# Patient Record
Sex: FEMALE | Race: WHITE | NOT HISPANIC OR LATINO | Employment: FULL TIME | URBAN - METROPOLITAN AREA
[De-identification: names, ages, dates, MRNs, and addresses within clinical notes are randomized per-mention and may not be internally consistent; named-entity substitution may affect disease eponyms.]

---

## 2018-12-04 ENCOUNTER — TELEPHONE (OUTPATIENT)
Dept: OTOLARYNGOLOGY | Facility: CLINIC | Age: 73
End: 2018-12-04

## 2018-12-04 NOTE — TELEPHONE ENCOUNTER
----- Message from Tho Roberts sent at 12/3/2018  3:40 PM CST -----  Contact: Mary MURILLO/[phone]  Mary is requesting a sooner appt for the patient due to her having blood in her ears.      Thank you

## 2018-12-04 NOTE — TELEPHONE ENCOUNTER
Notified kiki with [facility] that our soonest appt with dr lechuga is dec 20, 2018. Also I advised that I dont think the patient should wait that long if she has blood in her ears.

## 2020-04-16 ENCOUNTER — HOSPITAL ENCOUNTER (EMERGENCY)
Facility: HOSPITAL | Age: 75
Discharge: HOME | End: 2020-04-16
Attending: EMERGENCY MEDICINE
Payer: MEDICARE

## 2020-04-16 VITALS
DIASTOLIC BLOOD PRESSURE: 75 MMHG | TEMPERATURE: 99 F | SYSTOLIC BLOOD PRESSURE: 135 MMHG | BODY MASS INDEX: 32.32 KG/M2 | HEIGHT: 65 IN | HEART RATE: 75 BPM | OXYGEN SATURATION: 100 % | RESPIRATION RATE: 18 BRPM | WEIGHT: 194 LBS

## 2020-04-16 DIAGNOSIS — M54.42 LEFT-SIDED LOW BACK PAIN WITH LEFT-SIDED SCIATICA, UNSPECIFIED CHRONICITY: ICD-10-CM

## 2020-04-16 DIAGNOSIS — M54.32 SCIATICA OF LEFT SIDE: ICD-10-CM

## 2020-04-16 DIAGNOSIS — M51.26 LUMBAR HERNIATED DISC: Primary | ICD-10-CM

## 2020-04-16 PROCEDURE — 99284 EMERGENCY DEPT VISIT MOD MDM: CPT | Mod: 25

## 2020-04-16 PROCEDURE — 63600175 PHARM REV CODE 636 W HCPCS: Performed by: PHYSICIAN ASSISTANT

## 2020-04-16 PROCEDURE — 25000003 PHARM REV CODE 250: Performed by: PHYSICIAN ASSISTANT

## 2020-04-16 PROCEDURE — 96372 THER/PROPH/DIAG INJ SC/IM: CPT

## 2020-04-16 RX ORDER — MORPHINE SULFATE 10 MG/ML
6 INJECTION INTRAMUSCULAR; INTRAVENOUS; SUBCUTANEOUS
Status: COMPLETED | OUTPATIENT
Start: 2020-04-16 | End: 2020-04-16

## 2020-04-16 RX ORDER — TIZANIDINE 2 MG/1
2 TABLET ORAL EVERY 8 HOURS PRN
Qty: 15 TABLET | Refills: 0 | Status: SHIPPED | OUTPATIENT
Start: 2020-04-16

## 2020-04-16 RX ORDER — DIAZEPAM 5 MG/1
5 TABLET ORAL
Status: COMPLETED | OUTPATIENT
Start: 2020-04-16 | End: 2020-04-16

## 2020-04-16 RX ORDER — GABAPENTIN 600 MG/1
600 TABLET ORAL 3 TIMES DAILY
COMMUNITY
End: 2020-04-16

## 2020-04-16 RX ORDER — HYDROCODONE BITARTRATE AND ACETAMINOPHEN 5; 325 MG/1; MG/1
1 TABLET ORAL EVERY 6 HOURS PRN
Qty: 8 TABLET | Refills: 0 | Status: SHIPPED | OUTPATIENT
Start: 2020-04-16

## 2020-04-16 RX ORDER — PREDNISONE 20 MG/1
60 TABLET ORAL DAILY
Qty: 15 TABLET | Refills: 0 | Status: SHIPPED | OUTPATIENT
Start: 2020-04-16 | End: 2020-04-21

## 2020-04-16 RX ADMIN — MORPHINE SULFATE 6 MG: 10 INJECTION INTRAVENOUS at 05:04

## 2020-04-16 RX ADMIN — DIAZEPAM 5 MG: 5 TABLET ORAL at 07:04

## 2020-04-16 NOTE — ED TRIAGE NOTES
Patient arrived via EMS. Reports worsening of back pain over the past 3 days. Hx of sciatica. 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 Seen at Dr. Aguilera's office 2 days ago, given Cortisone injection with no relief. Also given rx for Gabapentin and Hycet, both last taken this morning.

## 2020-04-16 NOTE — ED PROVIDER NOTES
Encounter Date: 4/16/2020    SCRIBE #1 NOTE: I, Nimisha Cavazos, am scribing for, and in the presence of,  GONZÁLEZ Templeton. I have scribed the following portions of the note - Other sections scribed: HPI, ROS, PE.       History     Chief Complaint   Patient presents with    Back Pain     Pt reports low back pain, pt received injuection from MD recently and denies relief from medication.      This is a 74 y.o. female with a PMHx of Sciatica, Thyroid disease, and breast CA who presents to the ED complaining of left-sided lower back pain that worsened 2 days ago. She reports that she was sewing face masks 1 week ago and believes it may have aggravated her Sciatica. She notes that 2 days ago she was sitting in her car and the pain became unbearable. She states that the pain worsens with standing up and she adds that laying flat and applying pressure provides relief. She notes having numbness in her left hallux. She reports that she went to [facility] yesterday and the x-rays showed degenerative discs. She notes that she was given a steroid injection and prescribed Norco and Neurontin, which hasn't provided any relief. She denies any recent fall or trauma. She denies difficulty urinating, urinary incontinence, bowel incontinence, or numbness to the groin. She denies a past surgical history of back surgery. She notes a past surgical history of hysterectomy and breast lumpectomy. She reports allergies to Lisinopril and Topamax. She denies fever, chills, body aches, urinary frequency, and dysuria. No other associated symptoms.    The history is provided by the patient. No  was used.     Review of patient's allergies indicates:  No Known Allergies  Past Medical History:   Diagnosis Date    Breast cancer     Hypoglycemia     Migraines     Thyroid disease      Past Surgical History:   Procedure Laterality Date    BREAST LUMPECTOMY      HYSTERECTOMY       History reviewed. No pertinent family  history.  Social History     Tobacco Use    Smoking status: Never Smoker    Smokeless tobacco: Never Used   Substance Use Topics    Alcohol use: No     Alcohol/week: 0.0 standard drinks    Drug use: Never     Review of Systems   Constitutional: Negative for chills and fever.   HENT: Negative for sore throat.    Respiratory: Negative for shortness of breath.    Cardiovascular: Negative for chest pain.   Gastrointestinal: Negative for nausea.        (-) Bowel incontinence   Genitourinary: Negative for difficulty urinating, dysuria and frequency.        (-) Urinary incontinence   Musculoskeletal: Positive for back pain. Negative for myalgias.   Skin: Negative for rash.   Neurological: Positive for numbness (left hallux). Negative for weakness.   Hematological: Does not bruise/bleed easily.       Physical Exam     Initial Vitals [04/16/20 1617]   BP Pulse Resp Temp SpO2   (!) 140/67 (!) 57 18 98.1 °F (36.7 °C) 98 %      MAP       --         Physical Exam    Nursing note and vitals reviewed.  Constitutional: She appears well-developed and well-nourished. No distress.   HENT:   Head: Normocephalic and atraumatic.   Right Ear: Tympanic membrane normal.   Left Ear: Tympanic membrane normal.   Nose: Nose normal.   Mouth/Throat: Uvula is midline, oropharynx is clear and moist and mucous membranes are normal.   Eyes: EOM are normal. Pupils are equal, round, and reactive to light.   Neck: Trachea normal, normal range of motion, full passive range of motion without pain and phonation normal. Neck supple. No stridor present. No spinous process tenderness and no muscular tenderness present. Normal range of motion present. No neck rigidity.   Cardiovascular: Normal rate, regular rhythm and normal heart sounds. Exam reveals no gallop and no friction rub.    No murmur heard.  Pulmonary/Chest: Effort normal and breath sounds normal. No respiratory distress. She has no wheezes. She has no rhonchi. She has no rales.   Abdominal:  Soft. Bowel sounds are normal. There is no tenderness. There is no rebound and no guarding.   Musculoskeletal: Normal range of motion.   Neurological: She is alert and oriented to person, place, and time. She has normal strength. No cranial nerve deficit or sensory deficit.   Skin: Skin is warm and dry. Capillary refill takes less than 2 seconds.   Psychiatric: She has a normal mood and affect.         ED Course   Procedures  Labs Reviewed - No data to display       Imaging Results          MRI Lumbar Spine Without Contrast (Final result)  Result time 04/16/20 20:38:13    Final result by Calderon Corral MD (04/16/20 20:38:13)                 Impression:      Large left paracentral disc extrusion at the L4-L5 level with inferior migration and compression of the exiting left L5 nerve root.  Follow-up with spine surgery service is suggested.    Additional multilevel degenerative changes in the lower lumbar spine with posterior annular fissures extend from L3 through L5.  No significant spinal canal narrowing.      Electronically signed by: Calderon Corral MD  Date:    04/16/2020  Time:    20:38             Narrative:    EXAMINATION:  MRI LUMBAR SPINE WITHOUT CONTRAST    CLINICAL HISTORY:  Low back pain, <6wks, no red flags, no prior management;    TECHNIQUE:  Multiplanar, multisequence MR images were acquired from the thoracolumbar junction to the sacrum without the administration of contrast.    COMPARISON:  None.    FINDINGS:  There is straightening of the normal lumbar lordosis.  The lumbar alignment is otherwise unremarkable.  There are 5 lumbar type vertebral bodies.  The vertebral body heights are maintained.  There are scattered Schmorl's nodes throughout the lumbar spine.  The bone marrow signal is within normal limits.    The conus terminates at the level of L1.  The cauda equina nerve roots appear unremarkable.    There is multilevel disc desiccation.  This is most significant at the L4-L5 and L5-S1 levels.  There  are posterior annular fissures at the L3-L4, L4-L5, and L5-S1 levels.  Evaluation of the individual disc levels reveals the following:    L1-L2, there is minimal disc desiccation.  There is facet hypertrophy and ligamentum flavum hypertrophy.  The spinal canal and neural foramina are unremarkable.    L2-L3, there is disc desiccation.  There is ligamentum flavum hypertrophy and facet hypertrophy.  The spinal canal is within normal limits.  The neural foramina is unremarkable.    L3-L4, there is diffuse disc bulge along with facet hypertrophy and ligamentum flavum hypertrophy.  The spinal canal is within normal limits.  There is mild bilateral neural foraminal narrowing.    L4-L5, there is diffuse disc bulge along with facet hypertrophy and ligamentum flavum hypertrophy.  There is left paracentral disc extrusion with inferior migration.  This compression of the exiting left L5 nerve root.  There is mild narrowing the spinal canal.  There is mild right and moderate left neural foraminal narrowing.    L5-S1, there is a disc bulge along with facet hypertrophy and ligamentum flavum hypertrophy.  The spinal canal is within normal limits.  There is mild bilateral neural foraminal narrowing.    The paraspinal soft tissues are within normal limits.  There is no evidence of lymphadenopathy in the retroperitoneum.                                 Medical Decision Making:   History:   Old Medical Records: I decided to obtain old medical records.  Clinical Tests:   Radiological Study: Ordered and Reviewed  ED Management:  Hemodynamically stable. Non-toxic and in no acute distress. Denies any trauma or concerning cauda equina symptoms. Minimal relief with IM morphine. Discussed patient with my attending physician who also evaluated patient. Will obtain MRI lumbar spine. Pt signed out to oncoming ED provider team due to end of my shift. Dispo pending MRI results and reassessment.       This is Yasmany Grubertor, NP dictating.  I assumed  care of this patient from GONZÁLEZ Templeton at the end of his shift.  Patient states that her pain is improved following treatment with Valium.  MRI shows a large left paracentral disc extrusion at the L4-L5 level with inferior migration and compression of the exiting left L5 nerve root.  I discussed this case with Dr. Jose Lee (spinal surgery).  The patient will be discharged on pain medications, muscle relaxers, and oral steroids.  I will send Dr. Lee the patient's information in the EMR for follow-up.      Findings and plan discussed with the patient who is agreeable. ED return precautions given. All questions regarding diagnosis and plan were answered to the patient's fullest possible satisfaction. Patient expressed understanding of diagnosis, discharge instructions, and return precautions.            Patient note was created using [employer] voice dictation software.  Any errors in syntax may not have been identified and edited prior to signing this note.              Scribe Attestation:   Scribe #1: I performed the above scribed service and the documentation accurately describes the services I performed. I attest to the accuracy of the note.                          Clinical Impression:       ICD-10-CM ICD-9-CM   1. Lumbar herniated disc M51.26 722.10   2. Sciatica of left side M54.32 724.3   3. Left-sided low back pain with left-sided sciatica, unspecified chronicity M54.42 724.3         Disposition:   Disposition: Discharged  Condition: Stable     ED Disposition Condition    Discharge Stable        ED Prescriptions     Medication Sig Dispense Start Date End Date Auth. Provider    HYDROcodone-acetaminophen (NORCO) 5-325 mg per tablet Take 1 tablet by mouth every 6 (six) hours as needed for Pain. 8 tablet 4/16/2020  Yasmany Roe NP    predniSONE (DELTASONE) 20 MG tablet Take 3 tablets (60 mg total) by mouth once daily. for 5 days 15 tablet 4/16/2020 4/21/2020 Yasmany Roe NP    tiZANidine  (ZANAFLEX) 2 MG tablet Take 1 tablet (2 mg total) by mouth every 8 (eight) hours as needed (Muscle Spasms). 15 tablet 4/16/2020  Yasmany Roe NP        Follow-up Information     Follow up With Specialties Details Why Contact Info    Jose Lee MD Orthopedic Surgery, Spine Surgery Call  For further evaluation [address]  [facility] [address]  [phone]      [facility] Emergency Medicine Go to  If symptoms worsen, As needed [address]  [facility] [address] [phone]                      Scribe attestation: I, Shahid Up, personally performed the services described in this documentation.  All medical record entries made by the scribe were at my direction and in my presence.  I have reviewed the chart and agree that the record reflects my personal performance and is accurate and complete.             Yasmany Roe NP  04/16/20 4703

## 2020-04-17 ENCOUNTER — PES CALL (OUTPATIENT)
Dept: ADMINISTRATIVE | Facility: CLINIC | Age: 75
End: 2020-04-17

## 2020-04-17 NOTE — DISCHARGE INSTRUCTIONS
Take medications as prescribed.  Do not drive or drink alcohol when taking these medications because they will make you drowsy.    Follow-up with Dr. Lee as discussed.  Return to the emergency department for worsening symptoms.    Thank you for coming to our Emergency Department today. It is important to remember that some problems are difficult to diagnose and may not be found during your first visit. Be sure to follow up with your primary care doctor.  If you do not have one, you may contact the one listed on your discharge paperwork or you may also call the [facility] Appointment Desk at [phone] to schedule an appointment with one.     Return to the ER with any questions/concerns, new/concerning symptoms, worsening or failure to improve. Do not drive or make any important decisions for 24 hours if you have received any pain medications, sedatives or mood altering drugs during your ER visit.

## 2021-09-12 ENCOUNTER — HOSPITAL ENCOUNTER (EMERGENCY)
Facility: HOSPITAL | Age: 76
Discharge: HOME | End: 2021-09-12
Attending: EMERGENCY MEDICINE
Payer: MEDICARE

## 2021-09-12 VITALS
TEMPERATURE: 98 F | RESPIRATION RATE: 20 BRPM | OXYGEN SATURATION: 100 % | HEART RATE: 63 BPM | DIASTOLIC BLOOD PRESSURE: 83 MMHG | HEIGHT: 65 IN | BODY MASS INDEX: 30.32 KG/M2 | WEIGHT: 182 LBS | SYSTOLIC BLOOD PRESSURE: 120 MMHG

## 2021-09-12 DIAGNOSIS — N39.0 ACUTE URINARY TRACT INFECTION: Primary | ICD-10-CM

## 2021-09-12 LAB
ALBUMIN SERPL-MCNC: 4.1 G/DL (ref 3.3–5.5)
ALP SERPL-CCNC: 74 U/L (ref 42–141)
BILIRUB SERPL-MCNC: 0.8 MG/DL (ref 0.2–1.6)
BILIRUBIN, POC UA: ABNORMAL
BLOOD, POC UA: ABNORMAL
BUN SERPL-MCNC: 11 MG/DL (ref 7–22)
CALCIUM SERPL-MCNC: 9.8 MG/DL (ref 8–10.3)
CHLORIDE SERPL-SCNC: 104 MMOL/L (ref 98–108)
CLARITY, POC UA: ABNORMAL
COLOR, POC UA: ABNORMAL
CREAT SERPL-MCNC: 1 MG/DL (ref 0.6–1.2)
GLUCOSE SERPL-MCNC: 90 MG/DL (ref 73–118)
GLUCOSE, POC UA: ABNORMAL
KETONES, POC UA: ABNORMAL
LEUKOCYTE EST, POC UA: ABNORMAL
NITRITE, POC UA: POSITIVE
PH UR STRIP: >=9 [PH]
POC ALT (SGPT): 21 U/L (ref 10–47)
POC AST (SGOT): 34 U/L (ref 11–38)
POC CARDIAC TROPONIN I: 0.01 NG/ML
POC PTINR: 1 (ref 0.9–1.2)
POC PTWBT: 12.1 SEC (ref 9.7–14.3)
POC TCO2: 28 MMOL/L (ref 18–33)
POCT GLUCOSE: 86 MG/DL (ref 70–110)
POTASSIUM BLD-SCNC: 3.8 MMOL/L (ref 3.6–5.1)
PROTEIN, POC UA: ABNORMAL
PROTEIN, POC: 7.6 G/DL (ref 6.4–8.1)
SAMPLE: NORMAL
SAMPLE: NORMAL
SODIUM BLD-SCNC: 145 MMOL/L (ref 128–145)
SPECIFIC GRAVITY, POC UA: <=1.005
UROBILINOGEN, POC UA: >=8 E.U./DL

## 2021-09-12 PROCEDURE — 99284 EMERGENCY DEPT VISIT MOD MDM: CPT | Mod: 25,ER

## 2021-09-12 PROCEDURE — 87077 CULTURE AEROBIC IDENTIFY: CPT | Performed by: EMERGENCY MEDICINE

## 2021-09-12 PROCEDURE — 96365 THER/PROPH/DIAG IV INF INIT: CPT | Mod: ER

## 2021-09-12 PROCEDURE — 87186 SC STD MICRODIL/AGAR DIL: CPT | Performed by: EMERGENCY MEDICINE

## 2021-09-12 PROCEDURE — 80053 COMPREHEN METABOLIC PANEL: CPT | Mod: ER

## 2021-09-12 PROCEDURE — 85025 COMPLETE CBC W/AUTO DIFF WBC: CPT | Mod: ER

## 2021-09-12 PROCEDURE — 25000003 PHARM REV CODE 250: Mod: ER | Performed by: EMERGENCY MEDICINE

## 2021-09-12 PROCEDURE — 81003 URINALYSIS AUTO W/O SCOPE: CPT | Mod: ER

## 2021-09-12 PROCEDURE — 87088 URINE BACTERIA CULTURE: CPT | Performed by: EMERGENCY MEDICINE

## 2021-09-12 PROCEDURE — 85610 PROTHROMBIN TIME: CPT | Mod: ER

## 2021-09-12 PROCEDURE — 63600175 PHARM REV CODE 636 W HCPCS: Mod: ER | Performed by: EMERGENCY MEDICINE

## 2021-09-12 PROCEDURE — 87086 URINE CULTURE/COLONY COUNT: CPT | Performed by: EMERGENCY MEDICINE

## 2021-09-12 PROCEDURE — 82962 GLUCOSE BLOOD TEST: CPT | Mod: ER

## 2021-09-12 RX ORDER — IBUPROFEN 600 MG/1
600 TABLET ORAL EVERY 6 HOURS PRN
Qty: 20 TABLET | Refills: 0 | Status: SHIPPED | OUTPATIENT
Start: 2021-09-12

## 2021-09-12 RX ORDER — ACETAMINOPHEN 500 MG
1000 TABLET ORAL EVERY 6 HOURS PRN
Qty: 30 TABLET | Refills: 0 | Status: SHIPPED | OUTPATIENT
Start: 2021-09-12

## 2021-09-12 RX ORDER — PHENAZOPYRIDINE HYDROCHLORIDE 100 MG/1
200 TABLET, FILM COATED ORAL
Status: COMPLETED | OUTPATIENT
Start: 2021-09-12 | End: 2021-09-12

## 2021-09-12 RX ORDER — PHENAZOPYRIDINE HYDROCHLORIDE 200 MG/1
200 TABLET, FILM COATED ORAL 3 TIMES DAILY PRN
Qty: 6 TABLET | Refills: 0 | Status: SHIPPED | OUTPATIENT
Start: 2021-09-12 | End: 2021-09-14

## 2021-09-12 RX ORDER — SULFAMETHOXAZOLE AND TRIMETHOPRIM 800; 160 MG/1; MG/1
1 TABLET ORAL 2 TIMES DAILY
Qty: 10 TABLET | Refills: 0 | Status: SHIPPED | OUTPATIENT
Start: 2021-09-12 | End: 2021-09-17

## 2021-09-12 RX ADMIN — CEFTRIAXONE 1 G: 1 INJECTION, SOLUTION INTRAVENOUS at 08:09

## 2021-09-12 RX ADMIN — PHENAZOPYRIDINE HYDROCHLORIDE 200 MG: 100 TABLET ORAL at 09:09

## 2021-09-14 ENCOUNTER — TELEPHONE (OUTPATIENT)
Dept: UROLOGY | Facility: CLINIC | Age: 76
End: 2021-09-14

## 2021-09-14 LAB — BACTERIA UR CULT: ABNORMAL

## 2021-09-16 ENCOUNTER — TELEPHONE (OUTPATIENT)
Dept: EMERGENCY MEDICINE | Facility: HOSPITAL | Age: 76
End: 2021-09-16

## 2021-09-16 RX ORDER — DOXYCYCLINE 100 MG/1
100 CAPSULE ORAL EVERY 12 HOURS
Qty: 14 CAPSULE | Refills: 0 | Status: SHIPPED | OUTPATIENT
Start: 2021-09-16 | End: 2021-09-23

## 2021-09-16 RX ORDER — FLUCONAZOLE 150 MG/1
150 TABLET ORAL ONCE
Qty: 1 TABLET | Refills: 0 | Status: SHIPPED | OUTPATIENT
Start: 2021-09-16 | End: 2021-09-16

## 2021-09-22 ENCOUNTER — OFFICE VISIT (OUTPATIENT)
Dept: UROLOGY | Facility: CLINIC | Age: 76
End: 2021-09-22
Payer: MEDICARE

## 2021-09-22 VITALS — WEIGHT: 182.13 LBS | HEIGHT: 65 IN | BODY MASS INDEX: 30.34 KG/M2

## 2021-09-22 DIAGNOSIS — R15.1 FECAL SMEARING: ICD-10-CM

## 2021-09-22 DIAGNOSIS — N30.90 CYSTITIS: Primary | ICD-10-CM

## 2021-09-22 PROCEDURE — 99999 PR PBB SHADOW E&M-EST. PATIENT-LVL IV: ICD-10-PCS | Mod: PBBFAC,,, | Performed by: STUDENT IN AN ORGANIZED HEALTH CARE EDUCATION/TRAINING PROGRAM

## 2021-09-22 PROCEDURE — 1101F PR PT FALLS ASSESS DOC 0-1 FALLS W/OUT INJ PAST YR: ICD-10-PCS | Mod: CPTII,S$GLB,, | Performed by: STUDENT IN AN ORGANIZED HEALTH CARE EDUCATION/TRAINING PROGRAM

## 2021-09-22 PROCEDURE — 1160F RVW MEDS BY RX/DR IN RCRD: CPT | Mod: CPTII,S$GLB,, | Performed by: STUDENT IN AN ORGANIZED HEALTH CARE EDUCATION/TRAINING PROGRAM

## 2021-09-22 PROCEDURE — 3288F FALL RISK ASSESSMENT DOCD: CPT | Mod: CPTII,S$GLB,, | Performed by: STUDENT IN AN ORGANIZED HEALTH CARE EDUCATION/TRAINING PROGRAM

## 2021-09-22 PROCEDURE — 1126F AMNT PAIN NOTED NONE PRSNT: CPT | Mod: CPTII,S$GLB,, | Performed by: STUDENT IN AN ORGANIZED HEALTH CARE EDUCATION/TRAINING PROGRAM

## 2021-09-22 PROCEDURE — 99999 PR PBB SHADOW E&M-EST. PATIENT-LVL IV: CPT | Mod: PBBFAC,,, | Performed by: STUDENT IN AN ORGANIZED HEALTH CARE EDUCATION/TRAINING PROGRAM

## 2021-09-22 PROCEDURE — 1101F PT FALLS ASSESS-DOCD LE1/YR: CPT | Mod: CPTII,S$GLB,, | Performed by: STUDENT IN AN ORGANIZED HEALTH CARE EDUCATION/TRAINING PROGRAM

## 2021-09-22 PROCEDURE — 1159F MED LIST DOCD IN RCRD: CPT | Mod: CPTII,S$GLB,, | Performed by: STUDENT IN AN ORGANIZED HEALTH CARE EDUCATION/TRAINING PROGRAM

## 2021-09-22 PROCEDURE — 99203 PR OFFICE/OUTPT VISIT, NEW, LEVL III, 30-44 MIN: ICD-10-PCS | Mod: S$GLB,,, | Performed by: STUDENT IN AN ORGANIZED HEALTH CARE EDUCATION/TRAINING PROGRAM

## 2021-09-22 PROCEDURE — 1159F PR MEDICATION LIST DOCUMENTED IN MEDICAL RECORD: ICD-10-PCS | Mod: CPTII,S$GLB,, | Performed by: STUDENT IN AN ORGANIZED HEALTH CARE EDUCATION/TRAINING PROGRAM

## 2021-09-22 PROCEDURE — 99203 OFFICE O/P NEW LOW 30 MIN: CPT | Mod: S$GLB,,, | Performed by: STUDENT IN AN ORGANIZED HEALTH CARE EDUCATION/TRAINING PROGRAM

## 2021-09-22 PROCEDURE — 87086 URINE CULTURE/COLONY COUNT: CPT | Performed by: STUDENT IN AN ORGANIZED HEALTH CARE EDUCATION/TRAINING PROGRAM

## 2021-09-22 PROCEDURE — 3288F PR FALLS RISK ASSESSMENT DOCUMENTED: ICD-10-PCS | Mod: CPTII,S$GLB,, | Performed by: STUDENT IN AN ORGANIZED HEALTH CARE EDUCATION/TRAINING PROGRAM

## 2021-09-22 PROCEDURE — 1160F PR REVIEW ALL MEDS BY PRESCRIBER/CLIN PHARMACIST DOCUMENTED: ICD-10-PCS | Mod: CPTII,S$GLB,, | Performed by: STUDENT IN AN ORGANIZED HEALTH CARE EDUCATION/TRAINING PROGRAM

## 2021-09-22 PROCEDURE — 1126F PR PAIN SEVERITY QUANTIFIED, NO PAIN PRESENT: ICD-10-PCS | Mod: CPTII,S$GLB,, | Performed by: STUDENT IN AN ORGANIZED HEALTH CARE EDUCATION/TRAINING PROGRAM

## 2021-09-22 RX ORDER — GABAPENTIN 300 MG/1
300 CAPSULE ORAL 3 TIMES DAILY
COMMUNITY

## 2021-09-24 ENCOUNTER — TELEPHONE (OUTPATIENT)
Dept: UROLOGY | Facility: CLINIC | Age: 76
End: 2021-09-24

## 2021-09-24 LAB — BACTERIA UR CULT: NORMAL

## 2022-07-16 ENCOUNTER — HOSPITAL ENCOUNTER (EMERGENCY)
Facility: HOSPITAL | Age: 77
Discharge: HOME | End: 2022-07-16
Attending: INTERNAL MEDICINE
Payer: MEDICARE

## 2022-07-16 VITALS
DIASTOLIC BLOOD PRESSURE: 69 MMHG | HEIGHT: 65 IN | HEART RATE: 78 BPM | BODY MASS INDEX: 29.66 KG/M2 | RESPIRATION RATE: 17 BRPM | SYSTOLIC BLOOD PRESSURE: 131 MMHG | OXYGEN SATURATION: 98 % | TEMPERATURE: 98 F | WEIGHT: 178 LBS

## 2022-07-16 DIAGNOSIS — N30.01 ACUTE CYSTITIS WITH HEMATURIA: Primary | ICD-10-CM

## 2022-07-16 LAB
BILIRUBIN, POC UA: NEGATIVE
BLOOD, POC UA: ABNORMAL
CLARITY, POC UA: CLEAR
COLOR, POC UA: YELLOW
GLUCOSE, POC UA: NEGATIVE
KETONES, POC UA: NEGATIVE
LEUKOCYTE EST, POC UA: ABNORMAL
NITRITE, POC UA: NEGATIVE
PH UR STRIP: 6 [PH]
PROTEIN, POC UA: NEGATIVE
SPECIFIC GRAVITY, POC UA: 1.01
UROBILINOGEN, POC UA: 0.2 E.U./DL

## 2022-07-16 PROCEDURE — 81003 URINALYSIS AUTO W/O SCOPE: CPT | Mod: ER

## 2022-07-16 PROCEDURE — 99284 EMERGENCY DEPT VISIT MOD MDM: CPT | Mod: ER

## 2022-07-16 PROCEDURE — 25000003 PHARM REV CODE 250: Mod: ER | Performed by: INTERNAL MEDICINE

## 2022-07-16 PROCEDURE — 87086 URINE CULTURE/COLONY COUNT: CPT | Performed by: NURSE PRACTITIONER

## 2022-07-16 RX ORDER — PHENAZOPYRIDINE HYDROCHLORIDE 200 MG/1
200 TABLET, FILM COATED ORAL 3 TIMES DAILY
Qty: 6 TABLET | Refills: 0 | Status: SHIPPED | OUTPATIENT
Start: 2022-07-16 | End: 2022-07-18

## 2022-07-16 RX ORDER — NITROFURANTOIN 25; 75 MG/1; MG/1
100 CAPSULE ORAL
Status: COMPLETED | OUTPATIENT
Start: 2022-07-16 | End: 2022-07-16

## 2022-07-16 RX ORDER — NITROFURANTOIN 25; 75 MG/1; MG/1
100 CAPSULE ORAL 2 TIMES DAILY
Qty: 10 CAPSULE | Refills: 0 | Status: SHIPPED | OUTPATIENT
Start: 2022-07-16 | End: 2022-07-16 | Stop reason: SDUPTHER

## 2022-07-16 RX ORDER — NITROFURANTOIN 25; 75 MG/1; MG/1
100 CAPSULE ORAL 2 TIMES DAILY
Qty: 10 CAPSULE | Refills: 0 | Status: SHIPPED | OUTPATIENT
Start: 2022-07-16 | End: 2022-07-21

## 2022-07-16 RX ORDER — PHENAZOPYRIDINE HYDROCHLORIDE 100 MG/1
200 TABLET, FILM COATED ORAL
Status: COMPLETED | OUTPATIENT
Start: 2022-07-16 | End: 2022-07-16

## 2022-07-16 RX ADMIN — PHENAZOPYRIDINE HYDROCHLORIDE 200 MG: 100 TABLET ORAL at 11:07

## 2022-07-16 RX ADMIN — NITROFURANTOIN (MONOHYDRATE/MACROCRYSTALS) 100 MG: 75; 25 CAPSULE ORAL at 11:07

## 2022-07-17 NOTE — ED PROVIDER NOTES
Encounter Date: 7/16/2022       History     Chief Complaint   Patient presents with    Dysuria     Reports dysuria, frequency x 2 hours. Reports not completely emptying her bladder.      77-year-old female presents to the emergency department complaining dysuria and urinary frequency x2 hours.  She denies fever/chills/nausea/vomiting/chest pain.    The history is provided by the patient. No  was used.     Review of patient's allergies indicates:  No Known Allergies  Past Medical History:   Diagnosis Date    Breast cancer     Hypoglycemia     Migraines     Thyroid disease      Past Surgical History:   Procedure Laterality Date    BREAST LUMPECTOMY      HYSTERECTOMY       History reviewed. No pertinent family history.  Social History     Tobacco Use    Smoking status: Never Smoker    Smokeless tobacco: Never Used   Substance Use Topics    Alcohol use: No     Alcohol/week: 0.0 standard drinks    Drug use: Never     Review of Systems   Constitutional: Negative for chills and fever.   Respiratory: Negative for shortness of breath.    Cardiovascular: Negative for chest pain.   Gastrointestinal: Negative for nausea and vomiting.   Genitourinary: Positive for dysuria, frequency and urgency. Negative for flank pain, vaginal bleeding, vaginal discharge and vaginal pain.   All other systems reviewed and are negative.      Physical Exam     Initial Vitals [07/16/22 2028]   BP Pulse Resp Temp SpO2   131/69 78 17 97.9 °F (36.6 °C) 98 %      MAP       --         Physical Exam    Nursing note and vitals reviewed.  Constitutional: She is not diaphoretic. No distress.   HENT:   Head: Normocephalic and atraumatic.   Right Ear: External ear normal.   Left Ear: External ear normal.   Eyes: Conjunctivae are normal.   Neck:   Normal range of motion.  Cardiovascular: Normal rate.   Pulmonary/Chest: Breath sounds normal. No respiratory distress.   Abdominal: Abdomen is soft. Bowel sounds are normal. There is  no abdominal tenderness.   Musculoskeletal:         General: Normal range of motion.      Cervical back: Normal range of motion.     Neurological: She is alert.   Skin: Skin is warm and dry.   Psychiatric: She has a normal mood and affect.         ED Course   Procedures  Labs Reviewed   POCT URINALYSIS W/O SCOPE - Abnormal; Notable for the following components:       Result Value    Blood, UA 3+ (*)     Leukocytes, UA 2+ (*)     All other components within normal limits   CULTURE, URINE   POCT URINALYSIS W/O SCOPE          Imaging Results    None          Medications   nitrofurantoin (macrocrystal-monohydrate) 100 MG capsule 100 mg (100 mg Oral Given 7/16/22 2312)   phenazopyridine tablet 200 mg (200 mg Oral Given 7/16/22 2312)     Medical Decision Making:   Initial Assessment:   77-year-old female presents to the emergency department complaining dysuria and urinary frequency x2 hours.  She denies fever/chills/nausea/vomiting/chest pain.  ED Management:  Urinalysis reveals signs of infection.  Patient received Macrobid/Pyridium in the emergency department as well as prescriptions for each.  She was advised follow-up with her primary care physician within the next week for re-evaluation/return to the emergency department if condition worsens.                      Clinical Impression:   Final diagnoses:  [N30.01] Acute cystitis with hematuria (Primary)          ED Disposition Condition    Discharge Stable        ED Prescriptions     Medication Sig Dispense Start Date End Date Auth. Provider    nitrofurantoin, macrocrystal-monohydrate, (MACROBID) 100 MG capsule  (Status: Discontinued) Take 1 capsule (100 mg total) by mouth 2 (two) times daily. for 5 days 10 capsule 7/16/2022 7/16/2022 Myles Cooper MD    phenazopyridine (PYRIDIUM) 200 MG tablet Take 1 tablet (200 mg total) by mouth 3 (three) times daily. for 2 days 6 tablet 7/16/2022 7/18/2022 Myles Cooper MD    nitrofurantoin, macrocrystal-monohydrate,  (MACROBID) 100 MG capsule Take 1 capsule (100 mg total) by mouth 2 (two) times daily. for 5 days 10 capsule 7/16/2022 7/21/2022 Myles Cooper MD        Follow-up Information     Follow up With Specialties Details Why Contact Info    Ubaldo Nolasco MD Internal Medicine Schedule an appointment as soon as possible for a visit in 2 days For reevaluation [address] [place] [address]  [phone]      Ubaldo Nolasco MD Internal Medicine Schedule an appointment as soon as possible for a visit in 3 days For reevaluation [address] [place] [address]  [phone]             Myles Cooper MD  07/17/22 0649

## 2022-07-17 NOTE — FIRST PROVIDER EVALUATION
"Medical screening exam completed.  I have conducted a focused provider triage encounter, findings are as follows:    Brief history of present illness:  Dysuria and urinary frequency for 2 hours    Vitals:    07/16/22 2028   BP: 131/69   BP Location: Left arm   Patient Position: Sitting   Pulse: 78   Resp: 17   Temp: 97.9 °F (36.6 °C)   TempSrc: Oral   SpO2: 98%   Weight: 80.7 kg (178 lb)   Height: 5' 5" (1.651 m)       Pertinent physical exam:  NAD    Brief workup plan:  UA, urine culture    Preliminary workup initiated; this workup will be continued and followed by the physician or advanced practice provider that is assigned to the patient when roomed.  "

## 2022-07-18 LAB — BACTERIA UR CULT: NORMAL

## 2022-08-11 ENCOUNTER — OFFICE VISIT (OUTPATIENT)
Dept: SURGERY | Facility: CLINIC | Age: 77
End: 2022-08-11
Attending: SPECIALIST
Payer: MEDICARE

## 2022-08-11 VITALS
BODY MASS INDEX: 29.32 KG/M2 | SYSTOLIC BLOOD PRESSURE: 110 MMHG | HEART RATE: 64 BPM | DIASTOLIC BLOOD PRESSURE: 53 MMHG | HEIGHT: 65 IN | OXYGEN SATURATION: 81 % | WEIGHT: 176 LBS

## 2022-08-11 DIAGNOSIS — C50.011 BILATERAL MALIGNANT NEOPLASM INVOLVING BOTH NIPPLE AND AREOLA IN FEMALE, UNSPECIFIED ESTROGEN RECEPTOR STATUS: Primary | ICD-10-CM

## 2022-08-11 DIAGNOSIS — C50.012 BILATERAL MALIGNANT NEOPLASM INVOLVING BOTH NIPPLE AND AREOLA IN FEMALE, UNSPECIFIED ESTROGEN RECEPTOR STATUS: Primary | ICD-10-CM

## 2022-08-11 PROCEDURE — 3078F PR MOST RECENT DIASTOLIC BLOOD PRESSURE < 80 MM HG: ICD-10-PCS | Mod: CPTII,S$GLB,, | Performed by: SPECIALIST

## 2022-08-11 PROCEDURE — 3288F FALL RISK ASSESSMENT DOCD: CPT | Mod: CPTII,S$GLB,, | Performed by: SPECIALIST

## 2022-08-11 PROCEDURE — 1159F PR MEDICATION LIST DOCUMENTED IN MEDICAL RECORD: ICD-10-PCS | Mod: CPTII,S$GLB,, | Performed by: SPECIALIST

## 2022-08-11 PROCEDURE — 1159F MED LIST DOCD IN RCRD: CPT | Mod: CPTII,S$GLB,, | Performed by: SPECIALIST

## 2022-08-11 PROCEDURE — 99999 PR PBB SHADOW E&M-EST. PATIENT-LVL III: ICD-10-PCS | Mod: PBBFAC,,, | Performed by: SPECIALIST

## 2022-08-11 PROCEDURE — 99202 OFFICE O/P NEW SF 15 MIN: CPT | Mod: S$GLB,,, | Performed by: SPECIALIST

## 2022-08-11 PROCEDURE — 1101F PT FALLS ASSESS-DOCD LE1/YR: CPT | Mod: CPTII,S$GLB,, | Performed by: SPECIALIST

## 2022-08-11 PROCEDURE — 99999 PR PBB SHADOW E&M-EST. PATIENT-LVL III: CPT | Mod: PBBFAC,,, | Performed by: SPECIALIST

## 2022-08-11 PROCEDURE — 1126F AMNT PAIN NOTED NONE PRSNT: CPT | Mod: CPTII,S$GLB,, | Performed by: SPECIALIST

## 2022-08-11 PROCEDURE — 1101F PR PT FALLS ASSESS DOC 0-1 FALLS W/OUT INJ PAST YR: ICD-10-PCS | Mod: CPTII,S$GLB,, | Performed by: SPECIALIST

## 2022-08-11 PROCEDURE — 3074F PR MOST RECENT SYSTOLIC BLOOD PRESSURE < 130 MM HG: ICD-10-PCS | Mod: CPTII,S$GLB,, | Performed by: SPECIALIST

## 2022-08-11 PROCEDURE — 99202 PR OFFICE/OUTPT VISIT, NEW, LEVL II, 15-29 MIN: ICD-10-PCS | Mod: S$GLB,,, | Performed by: SPECIALIST

## 2022-08-11 PROCEDURE — 1126F PR PAIN SEVERITY QUANTIFIED, NO PAIN PRESENT: ICD-10-PCS | Mod: CPTII,S$GLB,, | Performed by: SPECIALIST

## 2022-08-11 PROCEDURE — 3078F DIAST BP <80 MM HG: CPT | Mod: CPTII,S$GLB,, | Performed by: SPECIALIST

## 2022-08-11 PROCEDURE — 3288F PR FALLS RISK ASSESSMENT DOCUMENTED: ICD-10-PCS | Mod: CPTII,S$GLB,, | Performed by: SPECIALIST

## 2022-08-11 PROCEDURE — 3074F SYST BP LT 130 MM HG: CPT | Mod: CPTII,S$GLB,, | Performed by: SPECIALIST

## 2022-08-11 NOTE — PROGRESS NOTES
Status post right lumpectomy by Dr. Ancelmo Thibodeaux March of 2016  HER2 equivocal, ER positive, IL positive  T1cpNO  1.2 cm, all margins clear, sentinel node negative.  Mammogram in June of this year at [facility] was negative for malignancy.    PE  No great signs either breast  No axillary or supraclavicular adenopathy  Right breast with transverse incision at 3:00 a.m., no suspicious masses, no great signs    Impression/plan surgically stable  RTC 1 year

## 2023-06-10 ENCOUNTER — NURSE TRIAGE (OUTPATIENT)
Dept: ADMINISTRATIVE | Facility: CLINIC | Age: 78
End: 2023-06-10
Payer: MEDICARE

## 2023-06-11 ENCOUNTER — HOSPITAL ENCOUNTER (EMERGENCY)
Facility: HOSPITAL | Age: 78
Discharge: HOME | End: 2023-06-11
Attending: STUDENT IN AN ORGANIZED HEALTH CARE EDUCATION/TRAINING PROGRAM
Payer: MEDICARE

## 2023-06-11 VITALS
SYSTOLIC BLOOD PRESSURE: 115 MMHG | TEMPERATURE: 98 F | DIASTOLIC BLOOD PRESSURE: 62 MMHG | HEART RATE: 60 BPM | WEIGHT: 170 LBS | RESPIRATION RATE: 16 BRPM | BODY MASS INDEX: 28.32 KG/M2 | OXYGEN SATURATION: 99 % | HEIGHT: 65 IN

## 2023-06-11 DIAGNOSIS — R68.2 DRY MOUTH: Primary | ICD-10-CM

## 2023-06-11 DIAGNOSIS — T50.905A ADVERSE EFFECT OF DRUG, INITIAL ENCOUNTER: ICD-10-CM

## 2023-06-11 LAB
ALBUMIN SERPL BCP-MCNC: 4 G/DL (ref 3.5–5.2)
ALP SERPL-CCNC: 61 U/L (ref 55–135)
ALT SERPL W/O P-5'-P-CCNC: 22 U/L (ref 10–44)
ANION GAP SERPL CALC-SCNC: 12 MMOL/L (ref 8–16)
AST SERPL-CCNC: 38 U/L (ref 10–40)
BASOPHILS # BLD AUTO: 0.04 K/UL (ref 0–0.2)
BASOPHILS NFR BLD: 0.8 % (ref 0–1.9)
BILIRUB SERPL-MCNC: 0.4 MG/DL (ref 0.1–1)
BUN SERPL-MCNC: 17 MG/DL (ref 8–23)
CALCIUM SERPL-MCNC: 8.9 MG/DL (ref 8.7–10.5)
CHLORIDE SERPL-SCNC: 105 MMOL/L (ref 95–110)
CK SERPL-CCNC: 495 U/L (ref 20–180)
CO2 SERPL-SCNC: 19 MMOL/L (ref 23–29)
CREAT SERPL-MCNC: 1.4 MG/DL (ref 0.5–1.4)
DIFFERENTIAL METHOD: ABNORMAL
EOSINOPHIL # BLD AUTO: 0.3 K/UL (ref 0–0.5)
EOSINOPHIL NFR BLD: 6.6 % (ref 0–8)
ERYTHROCYTE [DISTWIDTH] IN BLOOD BY AUTOMATED COUNT: 13 % (ref 11.5–14.5)
EST. GFR  (NO RACE VARIABLE): 39 ML/MIN/1.73 M^2
GLUCOSE SERPL-MCNC: 96 MG/DL (ref 70–110)
HCT VFR BLD AUTO: 38.7 % (ref 37–48.5)
HGB BLD-MCNC: 12.5 G/DL (ref 12–16)
IMM GRANULOCYTES # BLD AUTO: 0.01 K/UL (ref 0–0.04)
IMM GRANULOCYTES NFR BLD AUTO: 0.2 % (ref 0–0.5)
LYMPHOCYTES # BLD AUTO: 1.7 K/UL (ref 1–4.8)
LYMPHOCYTES NFR BLD: 32.4 % (ref 18–48)
MAGNESIUM SERPL-MCNC: 2.1 MG/DL (ref 1.6–2.6)
MCH RBC QN AUTO: 30.6 PG (ref 27–31)
MCHC RBC AUTO-ENTMCNC: 32.3 G/DL (ref 32–36)
MCV RBC AUTO: 95 FL (ref 82–98)
MONOCYTES # BLD AUTO: 0.5 K/UL (ref 0.3–1)
MONOCYTES NFR BLD: 9.6 % (ref 4–15)
NEUTROPHILS # BLD AUTO: 2.6 K/UL (ref 1.8–7.7)
NEUTROPHILS NFR BLD: 50.4 % (ref 38–73)
NRBC BLD-RTO: 0 /100 WBC
PLATELET # BLD AUTO: 149 K/UL (ref 150–450)
PMV BLD AUTO: 10.4 FL (ref 9.2–12.9)
POCT GLUCOSE: 96 MG/DL (ref 70–110)
POTASSIUM SERPL-SCNC: 4.8 MMOL/L (ref 3.5–5.1)
PROT SERPL-MCNC: 7.2 G/DL (ref 6–8.4)
RBC # BLD AUTO: 4.09 M/UL (ref 4–5.4)
SODIUM SERPL-SCNC: 136 MMOL/L (ref 136–145)
WBC # BLD AUTO: 5.19 K/UL (ref 3.9–12.7)

## 2023-06-11 PROCEDURE — 83735 ASSAY OF MAGNESIUM: CPT | Performed by: STUDENT IN AN ORGANIZED HEALTH CARE EDUCATION/TRAINING PROGRAM

## 2023-06-11 PROCEDURE — 25000003 PHARM REV CODE 250: Performed by: STUDENT IN AN ORGANIZED HEALTH CARE EDUCATION/TRAINING PROGRAM

## 2023-06-11 PROCEDURE — 96360 HYDRATION IV INFUSION INIT: CPT

## 2023-06-11 PROCEDURE — 82962 GLUCOSE BLOOD TEST: CPT

## 2023-06-11 PROCEDURE — 82550 ASSAY OF CK (CPK): CPT | Performed by: STUDENT IN AN ORGANIZED HEALTH CARE EDUCATION/TRAINING PROGRAM

## 2023-06-11 PROCEDURE — 80053 COMPREHEN METABOLIC PANEL: CPT | Performed by: STUDENT IN AN ORGANIZED HEALTH CARE EDUCATION/TRAINING PROGRAM

## 2023-06-11 PROCEDURE — 85025 COMPLETE CBC W/AUTO DIFF WBC: CPT | Performed by: STUDENT IN AN ORGANIZED HEALTH CARE EDUCATION/TRAINING PROGRAM

## 2023-06-11 PROCEDURE — 99284 EMERGENCY DEPT VISIT MOD MDM: CPT | Mod: 25

## 2023-06-11 RX ADMIN — SODIUM CHLORIDE 1000 ML: 9 INJECTION, SOLUTION INTRAVENOUS at 12:06

## 2023-06-11 NOTE — DISCHARGE INSTRUCTIONS
Thank you for coming to our Emergency Department today. It is important to remember that some problems are difficult to diagnose and may not be found during your first visit. Be sure to follow up with your primary care doctor and review any labs/imaging that was performed with them. If you do not have a primary care doctor, you may contact the one listed on your discharge paperwork or you may also call the [facility] Appointment Desk at [phone] to schedule an appointment with one.     All medications may potentially have side effects and it is impossible to predict which medications may give you side effects. If you feel that you are having a negative effect of any medication you should immediately stop taking them and seek medical attention.    Return to the ER with any questions/concerns, new/concerning symptoms, worsening or failure to improve. Do not drive or make any important decisions for 24 hours if you have received any pain medications, sedatives or mood altering drugs during your ER visit.

## 2023-06-11 NOTE — TELEPHONE ENCOUNTER
Reason for Disposition   [1] Dizziness, lightheadedness, or weakness AND [2] heart beating very rapidly (e.g., > 140 / minute)    Additional Information   Negative: Passed out (i.e., lost consciousness, collapsed and was not responding)   Negative: Shock suspected (e.g., cold/pale/clammy skin, too weak to stand, low BP, rapid pulse)   Negative: Difficult to awaken or acting confused (e.g., disoriented, slurred speech)   Negative: Visible sweat on face or sweat dripping down face   Negative: Unable to walk, or can only walk with assistance (e.g., requires support)   Negative: [1] Received SHOCK from implantable cardiac defibrillator AND [2] persisting symptoms (i.e., palpitations, lightheadedness)    Protocols used: Heart Rate and Heartbeat [id number]  pt states she is on Maxalt 10 mg for HA, took dose at 7pm. also took muscle relaxer (zanaflex 4mg)for back at 9pm. pt admits to feeling strange. Is this an interaction with these meds? Mouth real dry. Real drowsy. Heart beating fast when took muscle relaxer. Pt admits to feeling dizzy/lightheaded weak. rec EMS. Can I just go to an ED. Reiterate EMS. Pt agrees

## 2023-06-11 NOTE — ED PROVIDER NOTES
"Encounter Date: 6/11/2023       History     Chief Complaint   Patient presents with    Headache    Back Pain     Patient with chronic back pain/sciatica and took flexeril tonight for this. Also reports a headache, which is also not uncommon for her, and took a maxalt. Shortly ago, reports severe dry mouth and has drank 3 bottles of water since and feels like her "mouth is glued together".     77-year-old female with past medical history of migraines, thyroid disease, chronic back pain presents with dry mouth.  Patient reports she was having lower back pain and was told was secondary to sciatica to take Flexeril as needed.  She reports taking Flexeril around 7:00 p.m. yesterday, subsequently she began experiencing mild headache so took Maxalt and shortly after began experiencing significant dry mouth where she was not able to speak.  He reports drinking 3 bottles of water since then.  She reports her headache and lower back pain have resolved but given persistent dry mouth came to the emergency department for evaluation.  Denies any chest pain or shortness of breath.  Denies any nausea or vomiting.  Denies any rigidity hyperactivity.  Denies any fevers or chills or diaphoresis.  Reports taking all her medications as prescribed.      Review of patient's allergies indicates:  No Known Allergies  Past Medical History:   Diagnosis Date    Breast cancer     Hypoglycemia     Migraines     Thyroid disease      Past Surgical History:   Procedure Laterality Date    BREAST LUMPECTOMY      HYSTERECTOMY       No family history on file.  Social History     Tobacco Use    Smoking status: Never    Smokeless tobacco: Never   Substance Use Topics    Alcohol use: No     Alcohol/week: 0.0 standard drinks    Drug use: Never     Review of Systems   Constitutional:  Negative for chills and fever.   HENT:  Negative for congestion and rhinorrhea.    Eyes:  Negative for pain.   Respiratory:  Negative for cough and shortness of breath.  "   Cardiovascular:  Negative for chest pain and leg swelling.   Gastrointestinal:  Negative for abdominal pain, nausea and vomiting.   Endocrine: Negative for polyuria.   Genitourinary:  Negative for dysuria and hematuria.   Musculoskeletal:  Negative for gait problem and neck pain.   Skin:  Negative for rash.   Allergic/Immunologic: Negative for immunocompromised state.   Neurological:  Negative for weakness and headaches.     Physical Exam     Initial Vitals [06/11/23 0015]   BP Pulse Resp Temp SpO2   115/62 60 16 97.6 °F (36.4 °C) 99 %      MAP       --         Physical Exam    Nursing note and vitals reviewed.  Constitutional: She appears well-developed and well-nourished. She is not diaphoretic. No distress.   HENT:   Head: Normocephalic and atraumatic.   Mouth/Throat: Mucous membranes are dry.   Eyes: Conjunctivae and EOM are normal. Pupils are equal, round, and reactive to light.   Neck:   Normal range of motion.  Cardiovascular:  Regular rhythm.           Pulmonary/Chest: Breath sounds normal. No respiratory distress.   Abdominal: Abdomen is soft. Bowel sounds are normal. She exhibits no distension. There is no abdominal tenderness. There is no rebound and no guarding.   Musculoskeletal:         General: No tenderness. Normal range of motion.      Cervical back: Normal range of motion.     Lymphadenopathy:     She has no cervical adenopathy.   Neurological: She is alert and oriented to person, place, and time.   Skin: Skin is warm. Capillary refill takes less than 2 seconds.   Psychiatric: She has a normal mood and affect. Her behavior is normal.       ED Course   Procedures  Labs Reviewed   CBC W/ AUTO DIFFERENTIAL - Abnormal; Notable for the following components:       Result Value    Platelets 149 (*)     All other components within normal limits   COMPREHENSIVE METABOLIC PANEL - Abnormal; Notable for the following components:    CO2 19 (*)     eGFR 39 (*)     All other components within normal limits   CK  - Abnormal; Notable for the following components:     (*)     All other components within normal limits   MAGNESIUM   POCT GLUCOSE   POCT GLUCOSE MONITORING CONTINUOUS          Imaging Results    None          Medications   sodium chloride 0.9% bolus 1,000 mL 1,000 mL (0 mLs Intravenous Stopped 6/11/23 0138)     Medical Decision Making:   History:   Old Medical Records: I decided to obtain old medical records.  Initial Assessment:   77-year-old female with past medical history of migraines, thyroid disease, chronic back pain presents with dry mouth.  Patient in no acute distress, no focal neurological deficits on exam.  Vitals within normal limits.  Mild dry mucous membranes.  Suspect patient's symptoms secondary to combination of Maxalt and Flexeril.  No evidence of serotonin syndrome or hyperactivity at this time.  Patient not diaphoretic and does not have any other symptoms.  Labs were obtained that was grossly unremarkable.  Patient was given 1 L IV fluid with significant improvement of her symptoms.    Clinical Tests:   Lab Tests: Ordered and Reviewed           ED Course as of 06/11/23 0152   Sun Jun 11, 2023   0050 CBC without significant leukocytosis, anemia, or platelet abnormalities.  Chem 14 negative for hypo-or hyper natremia, kalemia, chloridemia, or other electrolyte abnormalities; BUN and creatinine were within normal limits indicating normal kidney function, ALT and AST were within normal limits indicating normal liver function.  CPK mildly elevated.  Not to the point where I would expect rhabdomyolysis.   [AS]   0148 The patient was reassessed and on subsequent re-evaluation, they were subjectively feeling better. They were resting comfortably and in no acute distress. I discussed the laboratory and diagnostic findings with the patient. Pt is currently stable for discharge. I see no indication of an emergent process beyond that addressed during our encounter but have duly counseled the  patient/family regarding the need for prompt follow-up as well as the indications that should prompt immediate return to the emergency room should new or worrisome developments occur. The patient/family has been provided with verbal and printed direction regarding our final diagnosis(es) as well as instructions regarding use of OTC and/or Rx medications intended to manage the patient's aforementioned conditions. The patient/family verbalized an understanding. The patient/family is asked if there are any questions or concerns. We discuss the case, until all issues are addressed to the patient/family's satisfaction. Patient/family understands and is agreeable to the plan.  [AS]      ED Course User Index  [AS] Ty Garcia MD            DISCLAIMER: This note was prepared with [employer] voice recognition transcription software. Garbled syntax, mangled pronouns, and other bizarre constructions may be attributed to that software system.        Clinical Impression:   Final diagnoses:  [R68.2] Dry mouth (Primary)  [T50.905A] Adverse effect of drug, initial encounter        ED Disposition Condition    Discharge Stable          ED Prescriptions    None       Follow-up Information       Follow up With Specialties Details Why Contact Info    Ubaldo Nolasco MD Internal Medicine Schedule an appointment as soon as possible for a visit  for reassesment [address] [place] [address]  [phone]      [facility] Emergency Medicine  If symptoms worsen [address]  [place] [address] [phone]             Ty Garcia MD  06/11/23 0152

## 2024-10-18 ENCOUNTER — NUTRITION (OUTPATIENT)
Dept: NUTRITION | Facility: CLINIC | Age: 79
End: 2024-10-18
Payer: MEDICARE

## 2024-10-18 VITALS — BODY MASS INDEX: 22.68 KG/M2 | HEIGHT: 65 IN | WEIGHT: 136.13 LBS

## 2024-10-18 DIAGNOSIS — Z71.3 DIETARY COUNSELING: ICD-10-CM

## 2024-10-18 DIAGNOSIS — E73.9 LACTOSE INTOLERANCE: Primary | ICD-10-CM

## 2024-10-18 DIAGNOSIS — E74.10 FRUCTOSE INTOLERANCE: ICD-10-CM

## 2024-10-18 DIAGNOSIS — R19.7 DIARRHEA, UNSPECIFIED TYPE: ICD-10-CM

## 2024-10-18 DIAGNOSIS — R63.4 UNINTENTIONAL WEIGHT LOSS: ICD-10-CM

## 2024-10-18 PROCEDURE — 99999 PR PBB SHADOW E&M-EST. PATIENT-LVL III: CPT | Mod: PBBFAC,,,

## 2024-10-18 NOTE — PROGRESS NOTES
"Referring Physician:Korey Mitchell MD     Reason for visit:  Chief Complaint   Patient presents with    lactose intolerant    Fructose malabsorption    Diarrhea    Nutrition Counseling    Weight Loss      Initial Visit    :1945     Allergies Reviewed  Meds Reviewed    Anthropometrics  Weight:61.7 kg (136 lb 2.1 oz)  Height:5' 5" (1.651 m)  BMI:Body mass index is 22.65 kg/m².   IBW: 57.0 kg  +/-10%    Meds:  Outpatient Medications Prior to Visit   Medication Sig Dispense Refill    acetaminophen (TYLENOL) 500 MG tablet Take 2 tablets (1,000 mg total) by mouth every 6 (six) hours as needed for Pain. 30 tablet 0    gabapentin (NEURONTIN) 300 MG capsule Take 300 mg by mouth 3 (three) times daily.      HYDROcodone-acetaminophen (NORCO) 5-325 mg per tablet Take 1 tablet by mouth every 6 (six) hours as needed for Pain. (Patient not taking: Reported on 2021) 8 tablet 0    hydrocodone-acetaminophen 5-325mg (NORCO) 5-325 mg per tablet Take 1 or 2 tabs every 4 hours as needed. (Patient not taking: Reported on 2021) 20 tablet 0    ibuprofen (ADVIL,MOTRIN) 600 MG tablet Take 1 tablet (600 mg total) by mouth every 6 (six) hours as needed for Pain (Take with food as needed for mild-to-moderate pain). (Patient not taking: Reported on 2021) 20 tablet 0    levothyroxine (SYNTHROID) 100 MCG tablet Take 100 mcg by mouth once daily.      nortriptyline (PAMELOR) 25 MG capsule Take 25 mg by mouth every evening.      rizatriptan (MAXALT-MLT) 10 MG disintegrating tablet Take 10 mg by mouth as needed for Migraine. May repeat in 2 hours if needed      tiZANidine (ZANAFLEX) 2 MG tablet Take 1 tablet (2 mg total) by mouth every 8 (eight) hours as needed (Muscle Spasms). (Patient not taking: Reported on 2021) 15 tablet 0     No facility-administered medications prior to visit.       Food/Drug Interactions Noted:  n/a    Vitamins/Supplements/Herbs:  n/a    Labs: n/a - pt was outside referral ([facility]) " "    Nutrition Prescription:  1851 Kcals/day( 30 kcal/kg),  62 g protein( 1.0 g/kg)     Support System:   pt does the grocery shopping and cooking for herself and her     Diet Hx:   pt with hx of lactose and fructose intolerance who is confused about what she should and should not eat.  She was given written diet information by doctor's office on fructose- and lactose- intolerance.  Has unintentional weight loss of ~35 lbs over past year.  She is fearful of eating anything which may cause her stomach upset and diarrhea.  States she is drinking a lot of Immodium (bought 2 bottles and has almost finished one).  Doesn't snack much; may have grace crackers; peanut butter crackers.  States she doesn't have much of an appetite; "when I'm hungry I eat".  Has found a bread at [employer] she can tolerate.  Can tolerate baked chicken, fried fish.  Has been avoiding dairy, cheese, chocolate.    Breakfast: rice or corn chex with unsweetened almond milk and Sweet & Low.  Coffee with Sweet & Low.  Lunch: [employer] plain hamburger with ketchup & mustard only, unsweetened tea with lemon.  At home:  sliced deli ham or turkey on bread from [employer] with red wine saulaisandra.  Dinner:  last night had can of [employer] chicken & rice soup, some Naked Chips, grace crackers, Gatorade.    Current activity level and/or physical limitations:  did not discuss exercise    Motivation to make changes/anticipated barriers and/or expected adherence:  pt seemed relieved to receive today's written information, and she stated she would try to keep a written food journal    Nutrition-Focus Physical Findings:  pt appeared thin, and anxious/nervous    Assessment:  pt attentive and asked numerous relevant questions about foods/beverages recommended and to avoid; keeping a food journal; reading food labels; grocery shopping and cooking tips; healthy snacks; importance of weight maintenance/not skipping meals.  Pt states she has done a lot of " independent research and seems knowledgeable of basic foods/beverages recommended and to avoid.  All of her questions were answered, and she verbalized understanding of information.    Nutrition Diagnosis:  Food- and nutrition-related knowledge deficit RT lack of prior exposure to information AEB dx fructose intolerance/lactose intolerance      Recommendations: lactose-free diet.   Limiting foods that contain free fructose and limiting portion sizes of food sources of fructose at meals or snacks will help relieve symptoms of fructose malabsorption.  Glucose helps fructose absorption, and consuming foods that have both sugars will decrease the likelihood of malabsorption.  Keeping a food and symptom diary can be helpful to identify the amounts and kinds of food that are tolerated and to adjust your diet to prevent symptoms.  Handouts provided & reviewed:  Fructose Intolerance Nutrition Therapy; Lactose-Controlled Nutrition Therapy; Lactose Intolerant Label Reading and Cooking Tips      Strategies Implemented:  keep a written food journal; continue to carefully read food labels    Consultation Time:60 minutes.  Communicated with referring healthcare provider:  Consult note available in pt's Epic chart per MD discretion  Follow Up:  Pt provided with dietitian contact information and advised to contact me with questions or make future appointment if further intervention needed.

## 2024-10-18 NOTE — PATIENT INSTRUCTIONS
Limiting foods that contain free fructose and limiting portion sizes of food sources of fructose at meals or snacks will help relieve symptoms of fructose malabsorption.  Glucose helps fructose absorption, and consuming foods that have both sugars will decrease the likelihood of malabsorption.  Keeping a food and symptom diary can be helpful to identify the amounts and kinds of food that are tolerated and to adjust your diet to prevent symptoms.  Lactose-free diet